# Patient Record
Sex: FEMALE | Race: WHITE | NOT HISPANIC OR LATINO
[De-identification: names, ages, dates, MRNs, and addresses within clinical notes are randomized per-mention and may not be internally consistent; named-entity substitution may affect disease eponyms.]

---

## 2021-03-03 ENCOUNTER — APPOINTMENT (OUTPATIENT)
Dept: SURGERY | Facility: CLINIC | Age: 31
End: 2021-03-03
Payer: MEDICAID

## 2021-03-03 VITALS
RESPIRATION RATE: 16 BRPM | WEIGHT: 126 LBS | HEIGHT: 67 IN | TEMPERATURE: 98 F | SYSTOLIC BLOOD PRESSURE: 123 MMHG | OXYGEN SATURATION: 98 % | DIASTOLIC BLOOD PRESSURE: 86 MMHG | HEART RATE: 110 BPM | BODY MASS INDEX: 19.78 KG/M2

## 2021-03-03 DIAGNOSIS — K64.1 SECOND DEGREE HEMORRHOIDS: ICD-10-CM

## 2021-03-03 DIAGNOSIS — K60.0 ACUTE ANAL FISSURE: ICD-10-CM

## 2021-03-03 DIAGNOSIS — K62.89 OTHER SPECIFIED DISEASES OF ANUS AND RECTUM: ICD-10-CM

## 2021-03-03 DIAGNOSIS — K59.4 ANAL SPASM: ICD-10-CM

## 2021-03-03 PROCEDURE — 99204 OFFICE O/P NEW MOD 45 MIN: CPT | Mod: 25

## 2021-03-03 PROCEDURE — 99072 ADDL SUPL MATRL&STAF TM PHE: CPT

## 2021-03-03 PROCEDURE — 46600 DIAGNOSTIC ANOSCOPY SPX: CPT

## 2021-03-03 RX ORDER — MAGNESIUM OXIDE/MAG AA CHELATE 300 MG
CAPSULE ORAL
Refills: 0 | Status: ACTIVE | COMMUNITY

## 2021-03-03 RX ORDER — UBIDECARENONE/VIT E ACET 100MG-5
CAPSULE ORAL
Refills: 0 | Status: ACTIVE | COMMUNITY

## 2021-03-03 NOTE — ASSESSMENT
[FreeTextEntry1] : I have seen and evaluated patient and I have corroborated all nursing input into this note.  Patient with severe pain with sitting and bowel movements.  She has severe internal sphincter spasm which is responsible for her pain.  She has taken a picture of prolapsing tissue which occasionally occurs.  A prolapsed hemorrhoid was noted.  I discussed the option of banding for the hemorrhoid but informed the patient that banding may significantly increase sphincter spasm so I would recommend against banding without addressing the spasm/fissure.  Therefore I recommended an exam under sedation with Botox injections and rubber band ligation since the patient has already failed topical therapy with diltiazem.  Indications, risk, benefits, alternatives reviewed including but not limited to bleeding, infection, recurrence, and failure.  The patient's  was present for the conversation and all questions were answered.

## 2021-03-03 NOTE — HISTORY OF PRESENT ILLNESS
[FreeTextEntry1] : The patient is a 30 year old female here for an evaluation of rectal pain. She has never had a colonoscopy. The patient reports hemorrhoidal symptoms that began one year ago. She never had any BRBPR. She was seen at another office (Unsure of the Doctors name), first dx with an anal fissure, used Nifedipine. Had prolapsing internal hemorrhoids that began 1 year ago, s/p sclerotherapy. Pt reports constant rectal pain, hurts to sit down. Pain unrelated to moving her bowels. She has 1 normal formed BM daily. Pt reports having to quit her job due to anorectal pain. She has prolapsing tissue that she is able to manually reduce. Now using Presto gel with some temporary relief.

## 2021-03-03 NOTE — PHYSICAL EXAM
[Thyroid] : the thyroid was normal [Normal Breath Sounds] : Normal breath sounds [Normal Heart Sounds] : normal heart sounds [Normal Rate and Rhythm] : normal rate and rhythm [No Edema] : No edema [No Rash or Lesion] : No rash or lesion [Alert] : alert [Oriented to Person] : oriented to person [Oriented to Place] : oriented to place [Oriented to Time] : oriented to time [Anxious] : anxious [Abdomen Masses] : No abdominal masses [Abdomen Tenderness] : ~T No ~M abdominal tenderness [JVD] : no jugular venous distention  [Wheezing] : no wheezing was heard [de-identified] : Appears well nourished, in no acute distress [de-identified] : WNL [de-identified] : Full ROM [FreeTextEntry1] : Anal inspection unremarkable.  Digital exam with severe sphincter spasm and left anterior anal canal tenderness.  Anoscopy with moderate internal hemorrhoid enlargement in the right anterior position.  Possible fissure noted left anterior

## 2022-01-31 ENCOUNTER — APPOINTMENT (OUTPATIENT)
Dept: OBGYN | Facility: CLINIC | Age: 32
End: 2022-01-31